# Patient Record
Sex: MALE | Race: ASIAN | Employment: UNEMPLOYED | URBAN - METROPOLITAN AREA
[De-identification: names, ages, dates, MRNs, and addresses within clinical notes are randomized per-mention and may not be internally consistent; named-entity substitution may affect disease eponyms.]

---

## 2020-01-16 ENCOUNTER — HOSPITAL ENCOUNTER (EMERGENCY)
Age: 40
Discharge: HOME | End: 2020-01-16

## 2020-01-16 ENCOUNTER — APPOINTMENT (OUTPATIENT)
Dept: GENERAL RADIOLOGY | Age: 40
End: 2020-01-16

## 2020-01-16 VITALS
HEART RATE: 86 BPM | HEIGHT: 67 IN | OXYGEN SATURATION: 97 % | DIASTOLIC BLOOD PRESSURE: 79 MMHG | RESPIRATION RATE: 16 BRPM | SYSTOLIC BLOOD PRESSURE: 128 MMHG | WEIGHT: 154.06 LBS | BODY MASS INDEX: 24.18 KG/M2 | TEMPERATURE: 97.2 F

## 2020-01-16 PROCEDURE — 99283 EMERGENCY DEPT VISIT LOW MDM: CPT

## 2020-01-16 PROCEDURE — 73030 X-RAY EXAM OF SHOULDER: CPT

## 2020-01-16 PROCEDURE — 6370000000 HC RX 637 (ALT 250 FOR IP): Performed by: PHYSICIAN ASSISTANT

## 2020-01-16 RX ORDER — NAPROXEN 500 MG/1
500 TABLET ORAL 2 TIMES DAILY PRN
Qty: 20 TABLET | Refills: 0 | Status: SHIPPED | OUTPATIENT
Start: 2020-01-16 | End: 2020-01-26

## 2020-01-16 RX ORDER — NAPROXEN 250 MG/1
500 TABLET ORAL ONCE
Status: COMPLETED | OUTPATIENT
Start: 2020-01-16 | End: 2020-01-16

## 2020-01-16 RX ORDER — LIDOCAINE 4 G/G
1 PATCH TOPICAL ONCE
Status: DISCONTINUED | OUTPATIENT
Start: 2020-01-16 | End: 2020-01-16 | Stop reason: HOSPADM

## 2020-01-16 RX ADMIN — NAPROXEN 500 MG: 250 TABLET ORAL at 19:40

## 2020-01-16 ASSESSMENT — ENCOUNTER SYMPTOMS
ABDOMINAL PAIN: 0
NAUSEA: 0
SHORTNESS OF BREATH: 0
VOMITING: 0
DIARRHEA: 0
CHEST TIGHTNESS: 0

## 2020-01-16 ASSESSMENT — PAIN DESCRIPTION - ORIENTATION: ORIENTATION: RIGHT

## 2020-01-16 ASSESSMENT — PAIN DESCRIPTION - LOCATION: LOCATION: SHOULDER

## 2020-01-16 ASSESSMENT — PAIN DESCRIPTION - PAIN TYPE: TYPE: ACUTE PAIN

## 2020-01-16 ASSESSMENT — PAIN SCALES - GENERAL: PAINLEVEL_OUTOF10: 5

## 2020-01-16 NOTE — LETTER
[facility] Emergency Department  [address] [place] [address], [address]             January 16, 2020    Patient: Tre Guevara   YOB: 1980   Date of Visit: 1/16/2020       To Whom It May Concern:    Tre Guevara was seen and treated in our emergency department on 1/16/2020. He may return to work on 1/17/19.       Sincerely,         Claudia Capone PA-C

## 2020-01-17 NOTE — ED PROVIDER NOTES
**EVALUATED BY ADVANCED PRACTICE PROVIDER**        [address]      Pt Name: Shima Sood  [facility]:[id number]  [place] 1980  Date of evaluation: 1/16/2020  Provider: Alessandra Lerner PA-C      Chief Complaint:    Chief Complaint   Patient presents with    Shoulder Pain     Pt. comes in today with complaints of right shoulder pain. pt. reports it started yesterday while he was at work lifting a box. Nursing Notes, Past Medical Hx, Past Surgical Hx, Social Hx, Allergies, and Family Hx were all reviewed and agreed with or any disagreements were addressed in the HPI.    HPI:  (Location, Duration, Timing, Severity, Quality, Assoc Sx, Context, Modifying factors)  This is a  35 y.o. male who is right-hand dominant presents to the emergency department with reports of lateral shoulder pain. Patient states that he was lifting a box overhead yesterday that weighed approximately 25 to 35 pounds when he states he had an onset of pain and discomfort. He states he did not hear or feel a pop. He states the pain that he was experiencing rates to be approximately 5 out of 10. Patient states he did nothing yet for the pain and discomfort. He denies that he is had difficulty such as this in the past.  He states certain movements today caused increasing levels of pain. He goes on to report he has not had previous injury such as this in the past.  He states that he is not experiencing any neck pain. He denies any numbness and or tingling. He is unsure if he is having any weakness but states it is not quite normal.  He goes on to report this is an isolated injury to his right shoulder and he has no additional complaints voiced at the present time. PastMedical/Surgical History:  History reviewed. No pertinent past medical history. History reviewed. No pertinent surgical history.     Medications:  Previous Medications    No medications on file       Review of Systems:  Review of Systems   Constitutional: Negative for activity change, chills and fever. Respiratory: Negative for chest tightness and shortness of breath. Cardiovascular: Negative for chest pain. Gastrointestinal: Negative for abdominal pain, diarrhea, nausea and vomiting. Genitourinary: Negative for dysuria and flank pain. Musculoskeletal: Positive for arthralgias and myalgias. Skin: Negative for rash and wound. Neurological: Negative for seizures, numbness and headaches. Positives and Pertinent negatives as per HPI. Except as noted above in the ROS, problem specific ROS was completed and is negative. Physical Exam:  Physical Exam  Vitals signs and nursing note reviewed. Constitutional:       General: He is not in acute distress. Appearance: He is well-developed. He is not diaphoretic. HENT:      Head: Normocephalic and atraumatic. Right Ear: External ear normal.      Left Ear: External ear normal.   Eyes:      General: No scleral icterus. Right eye: No discharge. Left eye: No discharge. Conjunctiva/sclera: Conjunctivae normal.   Neck:      Musculoskeletal: Normal range of motion. Vascular: No JVD. Cardiovascular:      Rate and Rhythm: Normal rate and regular rhythm. Heart sounds: No murmur. No friction rub. No gallop. Pulmonary:      Effort: Pulmonary effort is normal. No accessory muscle usage or respiratory distress. Breath sounds: Normal breath sounds. No wheezing, rhonchi or rales. Musculoskeletal:      Right shoulder: He exhibits tenderness, pain and decreased strength. He exhibits normal range of motion, no bony tenderness, no swelling, no effusion, no crepitus, no deformity, no laceration, no spasm and normal pulse. Cervical back: Normal.      Comments: Supple glenohumeral joint noted. Mild amount of tenderness to palpation over the bicipital groove.   Mild amount of tenderness noted over the anterior lateral subacromial space. Patient has reproducible tenderness to the drop arm test.  Elbow hand and wrist unremarkable and finding. C-spine unremarkable as well. Skin:     General: Skin is warm and dry. Neurological:      Mental Status: He is alert and oriented to person, place, and time. GCS: GCS eye subscore is 4. GCS verbal subscore is 5. GCS motor subscore is 6. Cranial Nerves: No cranial nerve deficit. Sensory: No sensory deficit. Coordination: Coordination normal.   Psychiatric:         Behavior: Behavior normal.         MEDICAL DECISION MAKING    Vitals:    Vitals:    01/16/20 1846   BP: 128/79   Pulse: 86   Resp: 16   Temp: 97.2 °F (36.2 °C)   TempSrc: Infrared   SpO2: 97%   Weight: 154 lb 1 oz (69.9 kg)   Height: 5' 7\" (1.702 m)       LABS:Labs Reviewed - No data to display     Remainder of labs reviewed and werenegative at this time or not returned at the time of this note. RADIOLOGY:   Non-plain film images such as CT, Ultrasound and MRI are read by the radiologist. Kai Cook PA-C have directly visualized the radiologic plain film image(s) with the below findings:        Interpretation per the Radiologist below, if available at the time of this note:    XR SHOULDER RIGHT (MIN 2 VIEWS)   Final Result   No acute abnormality. If the patient's symptoms persist, consider further evaluation with   nonemergent MRI. MEDICAL DECISION MAKING / ED COURSE:      PROCEDURES:   Procedures  None    Patient was given:  Medications   lidocaine 4 % external patch 1 patch (1 patch Transdermal Patch Applied 1/16/20 1941)   naproxen (NAPROSYN) tablet 500 mg (500 mg Oral Given 1/16/20 1940)       The patient's detailed history of present illness is documented as above. Upon arrival to the emergency department the patient's vital signs are as documented. The patient is noted to be hemodynamically stable and afebrile. Physical examination findings are as above.   Patient was medicated MG TABLET    Take 1 tablet by mouth 2 times daily as needed for Pain       DISCONTINUED MEDICATIONS:  Discontinued Medications    No medications on file            (Please note the MDM and HPI sections of this note were completed with a voice recognition program.  Efforts were made to edit the dictations but occasionally words are mis-transcribed.)    Electronically signed, Nitza Mayen PA-C,           Adis Hui PA-C  01/16/20 2009